# Patient Record
Sex: FEMALE | Race: WHITE | NOT HISPANIC OR LATINO | URBAN - METROPOLITAN AREA
[De-identification: names, ages, dates, MRNs, and addresses within clinical notes are randomized per-mention and may not be internally consistent; named-entity substitution may affect disease eponyms.]

---

## 2021-06-24 ENCOUNTER — TELEPHONE (OUTPATIENT)
Dept: HEMATOLOGY/ONCOLOGY | Facility: CLINIC | Age: 51
End: 2021-06-24

## 2022-03-11 ENCOUNTER — OFFICE VISIT (OUTPATIENT)
Dept: URGENT CARE | Facility: CLINIC | Age: 52
End: 2022-03-11
Payer: MEDICAID

## 2022-03-11 VITALS
RESPIRATION RATE: 16 BRPM | OXYGEN SATURATION: 96 % | BODY MASS INDEX: 26.5 KG/M2 | TEMPERATURE: 98 F | WEIGHT: 135 LBS | DIASTOLIC BLOOD PRESSURE: 76 MMHG | HEART RATE: 62 BPM | SYSTOLIC BLOOD PRESSURE: 112 MMHG | HEIGHT: 60 IN

## 2022-03-11 DIAGNOSIS — R10.9 ABDOMINAL PAIN, UNSPECIFIED ABDOMINAL LOCATION: Primary | ICD-10-CM

## 2022-03-11 DIAGNOSIS — R11.2 NAUSEA AND VOMITING, INTRACTABILITY OF VOMITING NOT SPECIFIED, UNSPECIFIED VOMITING TYPE: ICD-10-CM

## 2022-03-11 LAB
CTP QC/QA: YES
POC MOLECULAR INFLUENZA A AGN: NEGATIVE
POC MOLECULAR INFLUENZA B AGN: NEGATIVE

## 2022-03-11 PROCEDURE — 1160F RVW MEDS BY RX/DR IN RCRD: CPT | Mod: CPTII,S$GLB,, | Performed by: PHYSICIAN ASSISTANT

## 2022-03-11 PROCEDURE — 99203 OFFICE O/P NEW LOW 30 MIN: CPT | Mod: S$GLB,,, | Performed by: PHYSICIAN ASSISTANT

## 2022-03-11 PROCEDURE — 3008F BODY MASS INDEX DOCD: CPT | Mod: CPTII,S$GLB,, | Performed by: PHYSICIAN ASSISTANT

## 2022-03-11 PROCEDURE — S0119 ONDANSETRON 4 MG: HCPCS | Mod: S$GLB,,, | Performed by: PHYSICIAN ASSISTANT

## 2022-03-11 PROCEDURE — 3078F DIAST BP <80 MM HG: CPT | Mod: CPTII,S$GLB,, | Performed by: PHYSICIAN ASSISTANT

## 2022-03-11 PROCEDURE — 1159F MED LIST DOCD IN RCRD: CPT | Mod: CPTII,S$GLB,, | Performed by: PHYSICIAN ASSISTANT

## 2022-03-11 PROCEDURE — 99203 PR OFFICE/OUTPT VISIT, NEW, LEVL III, 30-44 MIN: ICD-10-PCS | Mod: S$GLB,,, | Performed by: PHYSICIAN ASSISTANT

## 2022-03-11 PROCEDURE — 3008F PR BODY MASS INDEX (BMI) DOCUMENTED: ICD-10-PCS | Mod: CPTII,S$GLB,, | Performed by: PHYSICIAN ASSISTANT

## 2022-03-11 PROCEDURE — 87502 POCT INFLUENZA A/B MOLECULAR: ICD-10-PCS | Mod: QW,S$GLB,, | Performed by: PHYSICIAN ASSISTANT

## 2022-03-11 PROCEDURE — 3074F SYST BP LT 130 MM HG: CPT | Mod: CPTII,S$GLB,, | Performed by: PHYSICIAN ASSISTANT

## 2022-03-11 PROCEDURE — 1159F PR MEDICATION LIST DOCUMENTED IN MEDICAL RECORD: ICD-10-PCS | Mod: CPTII,S$GLB,, | Performed by: PHYSICIAN ASSISTANT

## 2022-03-11 PROCEDURE — 87502 INFLUENZA DNA AMP PROBE: CPT | Mod: QW,S$GLB,, | Performed by: PHYSICIAN ASSISTANT

## 2022-03-11 PROCEDURE — 3078F PR MOST RECENT DIASTOLIC BLOOD PRESSURE < 80 MM HG: ICD-10-PCS | Mod: CPTII,S$GLB,, | Performed by: PHYSICIAN ASSISTANT

## 2022-03-11 PROCEDURE — 1160F PR REVIEW ALL MEDS BY PRESCRIBER/CLIN PHARMACIST DOCUMENTED: ICD-10-PCS | Mod: CPTII,S$GLB,, | Performed by: PHYSICIAN ASSISTANT

## 2022-03-11 PROCEDURE — S0119 PR ONDANSETRON, ORAL, 4MG: ICD-10-PCS | Mod: S$GLB,,, | Performed by: PHYSICIAN ASSISTANT

## 2022-03-11 PROCEDURE — 3074F PR MOST RECENT SYSTOLIC BLOOD PRESSURE < 130 MM HG: ICD-10-PCS | Mod: CPTII,S$GLB,, | Performed by: PHYSICIAN ASSISTANT

## 2022-03-11 RX ORDER — DICYCLOMINE HYDROCHLORIDE 20 MG/1
20 TABLET ORAL
Status: COMPLETED | OUTPATIENT
Start: 2022-03-11 | End: 2022-03-11

## 2022-03-11 RX ORDER — ONDANSETRON 4 MG/1
4 TABLET, ORALLY DISINTEGRATING ORAL
Status: COMPLETED | OUTPATIENT
Start: 2022-03-11 | End: 2022-03-11

## 2022-03-11 RX ORDER — ONDANSETRON 4 MG/1
4 TABLET, ORALLY DISINTEGRATING ORAL EVERY 6 HOURS PRN
Qty: 12 TABLET | Refills: 0 | Status: SHIPPED | OUTPATIENT
Start: 2022-03-11

## 2022-03-11 RX ADMIN — ONDANSETRON 4 MG: 4 TABLET, ORALLY DISINTEGRATING ORAL at 02:03

## 2022-03-11 RX ADMIN — DICYCLOMINE HYDROCHLORIDE 20 MG: 20 TABLET ORAL at 02:03

## 2022-03-11 NOTE — PATIENT INSTRUCTIONS
You were not able to produce a urine sample at today's clinic visit. You may need your urine tested if symptoms persist, as this could be related to a urinary tract infection.      You must understand that you've received an Urgent Care treatment only and that you may be released before all your medical problems are known or treated. You, the patient, will arrange for follow up care as instructed.      Follow up with your PCP or specialty clinic as instructed in the next 2-3 days if not improved or as needed. You can call [phone] to schedule an appointment with appropriate provider.      If you condition worsens, we recommend that you receive another evaluation at the emergency room immediately or contact your primary medical clinic's after hours call service to discuss your concerns.      Please return here or go to the Emergency Department for any concerns or worsening condition.      If you were prescribed a narcotic or controlled substance, do not drive or operate heavy equipment or machinery while taking these medications.

## 2022-03-11 NOTE — LETTER
March 11, 2022      [facility]  [address] [address]  Phone: [phone]  Fax: [phone]       Patient: Colleen Goldstein   YOB: 1970  Date of Visit: 03/11/2022    To Whom It May Concern:    Almita Goldstein  was at [facility] on 03/11/2022. She may return to work/school on 3/13/2022 with no restrictions. If you have any questions or concerns, or if I can be of further assistance, please do not hesitate to contact me.    Sincerely,    Dionne Rubio PA-C

## 2022-03-11 NOTE — PROGRESS NOTES
Subjective:       Patient ID: Colleen Goldstein is a 51 y.o. female.    Vitals:  height is 5' (1.524 m) and weight is 61.2 kg (135 lb). Her temperature is 97.7 °F (36.5 °C). Her blood pressure is 112/76 and her pulse is 62. Her respiration is 16 and oxygen saturation is 96%.     Chief Complaint: Abdominal Pain    Pt complaining of nausea and vomiting that started today. She is also having period cramps. She is also having shoulder pain    Patient provider note starts here:  Patient presents with complaints of nausea and vomiting with associated lower abdominal pain which started today. Reports about 3-4 episodes of nonbilious, nonbloody emesis. Denies fevers, urinary symptoms, diarrhea or known ill contacts. She also endorses right shoulder pain which she states is chronic, worsened with ROM. Denies recent fall or trauma. She has not taken any medications for her symptoms. Denies chest pain or SOB. Denies a history of abdominal surgeries other than a tubal and hysterectomy.     Abdominal Pain  This is a new problem. The current episode started today. Associated symptoms include nausea and vomiting. Pertinent negatives include no belching, constipation, diarrhea, dysuria, fever, frequency or headaches.       Constitution: Negative for fatigue and fever.   Neck: Negative for neck pain.   Cardiovascular: Negative for chest pain, palpitations and sob on exertion.   Respiratory: Negative for chest tightness and wheezing.    Gastrointestinal: Positive for abdominal pain, nausea and vomiting. Negative for constipation and diarrhea.   Genitourinary: Negative for dysuria, frequency, urgency and flank pain.   Skin: Negative for color change and wound.   Neurological: Negative for headaches, numbness and tingling.       Objective:      Physical Exam   Constitutional: She is oriented to person, place, and time. She appears well-developed.   HENT:   Head: Normocephalic and atraumatic.   Ears:   Right Ear: External ear normal.    Left Ear: External ear normal.   Nose: Nose normal.   Mouth/Throat: Mucous membranes are normal.   Eyes: Conjunctivae and lids are normal.   Neck: Trachea normal. Neck supple.   Cardiovascular: Normal rate, regular rhythm and normal heart sounds.   Pulmonary/Chest: Effort normal and breath sounds normal. No respiratory distress.   Abdominal: Normal appearance and bowel sounds are normal. She exhibits no distension, no abdominal bruit, no pulsatile midline mass and no mass. Soft. There is abdominal tenderness. There is no rebound, no guarding, no left CVA tenderness and no right CVA tenderness.      Comments: Mild tenderness across the pelvic region with palpation. There is no rebound or guarding noted.      Musculoskeletal: Normal range of motion.         General: Normal range of motion.   Neurological: She is alert and oriented to person, place, and time. She has normal strength.   Skin: Skin is warm, dry, intact, not diaphoretic and not pale.   Psychiatric: Her speech is normal and behavior is normal. Judgment and thought content normal.   Nursing note and vitals reviewed.        Assessment:       1. Abdominal pain, unspecified abdominal location    2. Nausea and vomiting, intractability of vomiting not specified, unspecified vomiting type        Results for orders placed or performed in visit on 03/11/22   POCT Influenza A/B MOLECULAR   Result Value Ref Range    POC Molecular Influenza A Ag Negative Negative, Not Reported    POC Molecular Influenza B Ag Negative Negative, Not Reported     Acceptable Yes        Plan:         Abdominal pain, unspecified abdominal location  -     dicyclomine tablet 20 mg  -     POCT Urinalysis, Dipstick, Automated, W/O Scope  -     POCT Influenza A/B MOLECULAR    Nausea and vomiting, intractability of vomiting not specified, unspecified vomiting type  -     ondansetron disintegrating tablet 4 mg  -     POCT Influenza A/B MOLECULAR  -     ondansetron (ZOFRAN-ODT) 4  MG TbDL; Take 1 tablet (4 mg total) by mouth every 6 (six) hours as needed (NAUSEA).  Dispense: 12 tablet; Refill: 0           Medical Decision Making:   History:   Old Medical Records: I decided to obtain old medical records.  Differential Diagnosis:   Differential Diagnosis includes, but is not limited to:  aortic dissection, mesenteric ischemia, perforated bowel, SBO, ileus, appendicitis, cholecystitis, diverticulitis, nephrolithiasis, pancreatitis, gastroenteritis, colitis, biliary colic, GERD, PUD, constipation, UTI, influenza    Clinical Tests:   Lab Tests: Ordered and Reviewed       <> Summary of Lab: Influenza negative  UA: patient unable to give specimen  Urgent Care Management:  Patient presents with complaints of lower abdominal pain as well as associated nausea and vomiting since today.  On exam, she is afebrile and nontoxic appearing.  Abdomen is tender to the lower pelvic region however, there is no rebound or guarding.  She is afebrile.  I do not suspect occult intra-abdominal process at this time however, it remains in the differential.  Influenza negative.  Patient was unable to produce a urine specimen after waiting in the clinic for an extended period of time.  She declined waiting longer to produce a specimen and reported that if this pain and her symptoms persisted she would be going to the emergency department for further evaluation.  She understands that this could be a urinary tract infection, pyelonephritis which would not be diagnosed due to the lack of a urinalysis today. She verbalized understanding. ED precautions discussed.        Patient Instructions   You were not able to produce a urine sample at today's clinic visit. You may need your urine tested if symptoms persist, as this could be related to a urinary tract infection.      You must understand that you've received an Urgent Care treatment only and that you may be released before all your medical problems are known or treated.  You, the patient, will arrange for follow up care as instructed.      Follow up with your PCP or specialty clinic as instructed in the next 2-3 days if not improved or as needed. You can call [phone] to schedule an appointment with appropriate provider.      If you condition worsens, we recommend that you receive another evaluation at the emergency room immediately or contact your primary medical clinic's after hours call service to discuss your concerns.      Please return here or go to the Emergency Department for any concerns or worsening condition.      If you were prescribed a narcotic or controlled substance, do not drive or operate heavy equipment or machinery while taking these medications.

## 2024-05-07 ENCOUNTER — TELEPHONE (OUTPATIENT)
Dept: PSYCHIATRY | Facility: CLINIC | Age: 54
End: 2024-05-07
Payer: MEDICAID

## 2024-05-07 NOTE — TELEPHONE ENCOUNTER
----- Message from Jimena Ko sent at 2024  9:30 AM CDT -----  Contact: Patient  Colleen Goldstein  MRN: [id number]  : 1970  PCP: Reji Garcia  Home Phone      [phone]  Work Phone      Not on file.  Mobile          [phone]      MESSAGE: Patient would like a returned call to establish care    PHONE; [phone]

## 2024-08-06 ENCOUNTER — TELEPHONE (OUTPATIENT)
Dept: PSYCHIATRY | Facility: CLINIC | Age: 54
End: 2024-08-06
Payer: MEDICAID